# Patient Record
Sex: FEMALE | Race: WHITE
[De-identification: names, ages, dates, MRNs, and addresses within clinical notes are randomized per-mention and may not be internally consistent; named-entity substitution may affect disease eponyms.]

---

## 2019-01-03 ENCOUNTER — HOSPITAL ENCOUNTER (OUTPATIENT)
Age: 64
End: 2019-01-03
Payer: MEDICARE

## 2019-01-03 DIAGNOSIS — H92.02: ICD-10-CM

## 2019-01-03 DIAGNOSIS — R51: ICD-10-CM

## 2019-01-03 DIAGNOSIS — H93.12: Primary | ICD-10-CM

## 2019-01-03 LAB
CREATININE FINGERSTICK: 1.6 MG/DL (ref 0.55–1.02)
EGFR FINGERSTICK: 35 ML/MIN (ref 60–?)

## 2019-01-03 PROCEDURE — 70551 MRI BRAIN STEM W/O DYE: CPT

## 2019-05-06 ENCOUNTER — HOSPITAL ENCOUNTER (OUTPATIENT)
Age: 64
End: 2019-05-06
Payer: MEDICARE

## 2019-05-06 VITALS — BODY MASS INDEX: 40 KG/M2

## 2019-05-06 DIAGNOSIS — R94.31: Primary | ICD-10-CM

## 2019-05-06 PROCEDURE — 93306 TTE W/DOPPLER COMPLETE: CPT

## 2019-05-06 PROCEDURE — A4216 STERILE WATER/SALINE, 10 ML: HCPCS

## 2019-05-06 PROCEDURE — C8929 TTE W OR WO FOL WCON,DOPPLER: HCPCS

## 2020-09-10 ENCOUNTER — HOSPITAL ENCOUNTER (OUTPATIENT)
Age: 65
End: 2020-09-10
Payer: MEDICARE

## 2020-09-10 VITALS — BODY MASS INDEX: 39 KG/M2

## 2020-09-10 DIAGNOSIS — R06.02: ICD-10-CM

## 2020-09-10 DIAGNOSIS — I42.8: Primary | ICD-10-CM

## 2020-09-10 DIAGNOSIS — R06.00: ICD-10-CM

## 2020-09-10 PROCEDURE — 93306 TTE W/DOPPLER COMPLETE: CPT

## 2021-01-29 ENCOUNTER — HOSPITAL ENCOUNTER (OUTPATIENT)
Age: 66
End: 2021-01-29
Payer: MEDICARE

## 2021-01-29 VITALS — BODY MASS INDEX: 42.3 KG/M2

## 2021-01-29 DIAGNOSIS — I10: ICD-10-CM

## 2021-01-29 DIAGNOSIS — E78.2: ICD-10-CM

## 2021-01-29 DIAGNOSIS — I42.8: Primary | ICD-10-CM

## 2021-01-29 LAB
ANION GAP: 7 (ref 5–15)
BNP,B-TYPE NATRIURETIC PEPTIDE: 55 PG/ML (ref 0–100)
BUN SERPL-MCNC: 20 MG/DL (ref 7–18)
BUN/CREAT RATIO: 16.7 RATIO (ref 10–20)
CALCIUM SERPL-MCNC: 9.2 MG/DL (ref 8.5–10.1)
CARBON DIOXIDE: 24 MMOL/L (ref 21–32)
CHLORIDE: 109 MMOL/L (ref 98–107)
DEPRECATED RDW RBC: 48.3 FL (ref 35.1–43.9)
ERYTHROCYTE [DISTWIDTH] IN BLOOD: 13.6 % (ref 11.6–14.6)
EST GLOM FILT RATE - AFR AMER: 58 ML/MIN (ref 60–?)
GLUCOSE: 95 MG/DL (ref 74–106)
HCT VFR BLD AUTO: 41.8 % (ref 37–47)
HEMOGLOBIN: 13.5 G/DL (ref 12–15)
HGB BLD-MCNC: 13.5 G/DL (ref 12–15)
IMMATURE GRANULOCYTES COUNT: 0.01 X10^3/UL (ref 0–0)
MCV RBC: 96.1 FL (ref 81–99)
MEAN CORP HGB CONC: 32.3 G/DL (ref 32–36)
MEAN PLATELET VOL.: 9.6 FL (ref 6.2–12)
NRBC FLAGGED BY ANALYZER: 0 % (ref 0–5)
PLATELET # BLD: 217 K/MM3 (ref 150–450)
PLATELET COUNT: 217 K/MM3 (ref 150–450)
POTASSIUM: 4.4 MMOL/L (ref 3.5–5.1)
RBC # BLD AUTO: 4.35 M/MM3 (ref 4.2–5.4)
RBC DISTRIBUTION WIDTH CV: 13.6 % (ref 11.6–14.6)
RBC DISTRIBUTION WIDTH SD: 48.3 FL (ref 35.1–43.9)
WBC # BLD AUTO: 6.2 K/MM3 (ref 4.4–11)
WHITE BLOOD COUNT: 6.2 K/MM3 (ref 4.4–11)

## 2021-01-29 PROCEDURE — 85025 COMPLETE CBC W/AUTO DIFF WBC: CPT

## 2021-01-29 PROCEDURE — 36415 COLL VENOUS BLD VENIPUNCTURE: CPT

## 2021-01-29 PROCEDURE — 80048 BASIC METABOLIC PNL TOTAL CA: CPT

## 2021-01-29 PROCEDURE — 83880 ASSAY OF NATRIURETIC PEPTIDE: CPT

## 2021-11-15 ENCOUNTER — HOSPITAL ENCOUNTER (OUTPATIENT)
Age: 66
End: 2021-11-15
Payer: MEDICARE

## 2021-11-15 DIAGNOSIS — R06.00: ICD-10-CM

## 2021-11-15 DIAGNOSIS — I10: ICD-10-CM

## 2021-11-15 DIAGNOSIS — R53.83: Primary | ICD-10-CM

## 2021-11-15 LAB
ANION GAP: 7 (ref 5–15)
BNP,B-TYPE NATRIURETIC PEPTIDE: 49.7 PG/ML (ref 0–100)
BUN SERPL-MCNC: 25 MG/DL (ref 7–18)
BUN/CREAT RATIO: 20.2 RATIO (ref 10–20)
CALCIUM SERPL-MCNC: 9.4 MG/DL (ref 8.5–10.1)
CARBON DIOXIDE: 21 MMOL/L (ref 21–32)
CHLORIDE: 110 MMOL/L (ref 98–107)
DEPRECATED RDW RBC: 47.6 FL (ref 35.1–43.9)
ERYTHROCYTE [DISTWIDTH] IN BLOOD: 13.8 % (ref 11.6–14.6)
EST GLOM FILT RATE - AFR AMER: 55 ML/MIN (ref 60–?)
GLUCOSE: 91 MG/DL (ref 74–106)
HCT VFR BLD AUTO: 39.3 % (ref 37–47)
HEMOGLOBIN: 13.1 G/DL (ref 12–15)
HGB BLD-MCNC: 13.1 G/DL (ref 12–15)
IMMATURE GRANULOCYTES COUNT: 0.02 X10^3/UL (ref 0–0)
MCV RBC: 94 FL (ref 81–99)
MEAN CORP HGB CONC: 33.3 G/DL (ref 32–36)
MEAN PLATELET VOL.: 9.3 FL (ref 6.2–12)
NRBC FLAGGED BY ANALYZER: 0 % (ref 0–5)
PLATELET # BLD: 213 K/MM3 (ref 150–450)
PLATELET COUNT: 213 K/MM3 (ref 150–450)
POTASSIUM: 4.2 MMOL/L (ref 3.5–5.1)
RBC # BLD AUTO: 4.18 M/MM3 (ref 4.2–5.4)
RBC DISTRIBUTION WIDTH CV: 13.8 % (ref 11.6–14.6)
RBC DISTRIBUTION WIDTH SD: 47.6 FL (ref 35.1–43.9)
WBC # BLD AUTO: 6.8 K/MM3 (ref 4.4–11)
WHITE BLOOD COUNT: 6.8 K/MM3 (ref 4.4–11)

## 2021-11-15 PROCEDURE — 36415 COLL VENOUS BLD VENIPUNCTURE: CPT

## 2021-11-15 PROCEDURE — 80048 BASIC METABOLIC PNL TOTAL CA: CPT

## 2021-11-15 PROCEDURE — 83880 ASSAY OF NATRIURETIC PEPTIDE: CPT

## 2021-11-15 PROCEDURE — 85025 COMPLETE CBC W/AUTO DIFF WBC: CPT

## 2023-01-10 ENCOUNTER — HOSPITAL ENCOUNTER (OUTPATIENT)
Dept: HOSPITAL 100 - LAB | Age: 68
Discharge: HOME | End: 2023-01-10
Payer: MEDICARE

## 2023-01-10 DIAGNOSIS — I42.8: Primary | ICD-10-CM

## 2023-01-10 DIAGNOSIS — R53.83: ICD-10-CM

## 2023-01-10 DIAGNOSIS — R06.00: ICD-10-CM

## 2023-01-10 DIAGNOSIS — I10: ICD-10-CM

## 2023-01-10 LAB
ANION GAP: 8 (ref 5–15)
BNP,B-TYPE NATRIURETIC PEPTIDE: 106.1 PG/ML (ref 0–100)
BUN SERPL-MCNC: 19 MG/DL (ref 7–18)
BUN/CREAT RATIO: 18.3 RATIO (ref 10–20)
CALCIUM SERPL-MCNC: 9.6 MG/DL (ref 8.5–10.1)
CARBON DIOXIDE: 22 MMOL/L (ref 21–32)
CHLORIDE: 110 MMOL/L (ref 98–107)
DEPRECATED RDW RBC: 45 FL (ref 35.1–43.9)
ERYTHROCYTE [DISTWIDTH] IN BLOOD: 13.2 % (ref 11.6–14.6)
EST GLOM FILT RATE - AFR AMER: 68 ML/MIN (ref 60–?)
GLUCOSE: 99 MG/DL (ref 74–106)
HCT VFR BLD AUTO: 41.8 % (ref 37–47)
HEMOGLOBIN: 14 G/DL (ref 12–15)
HGB BLD-MCNC: 14 G/DL (ref 12–15)
IMMATURE GRANULOCYTES COUNT: 0.03 X10^3/UL (ref 0–0)
MCV RBC: 93.5 FL (ref 81–99)
MEAN CORP HGB CONC: 33.5 G/DL (ref 32–36)
MEAN PLATELET VOL.: 9.9 FL (ref 6.2–12)
NRBC FLAGGED BY ANALYZER: 0 % (ref 0–5)
PLATELET # BLD: 244 K/MM3 (ref 150–450)
PLATELET COUNT: 244 K/MM3 (ref 150–450)
POTASSIUM: 4.2 MMOL/L (ref 3.5–5.1)
RBC # BLD AUTO: 4.47 M/MM3 (ref 4.2–5.4)
RBC DISTRIBUTION WIDTH CV: 13.2 % (ref 11.6–14.6)
RBC DISTRIBUTION WIDTH SD: 45 FL (ref 35.1–43.9)
WBC # BLD AUTO: 6 K/MM3 (ref 4.4–11)
WHITE BLOOD COUNT: 6 K/MM3 (ref 4.4–11)

## 2023-01-10 PROCEDURE — 36415 COLL VENOUS BLD VENIPUNCTURE: CPT

## 2023-01-10 PROCEDURE — 85025 COMPLETE CBC W/AUTO DIFF WBC: CPT

## 2023-01-10 PROCEDURE — 80048 BASIC METABOLIC PNL TOTAL CA: CPT

## 2023-01-10 PROCEDURE — 84443 ASSAY THYROID STIM HORMONE: CPT

## 2023-01-10 PROCEDURE — 83880 ASSAY OF NATRIURETIC PEPTIDE: CPT

## 2023-04-24 ENCOUNTER — HOSPITAL ENCOUNTER (OUTPATIENT)
Age: 68
Discharge: HOME | End: 2023-04-24
Payer: MEDICARE

## 2023-04-24 DIAGNOSIS — I42.8: Primary | ICD-10-CM

## 2023-04-24 DIAGNOSIS — I50.9: ICD-10-CM

## 2023-04-24 PROCEDURE — A4216 STERILE WATER/SALINE, 10 ML: HCPCS

## 2023-04-24 PROCEDURE — 93306 TTE W/DOPPLER COMPLETE: CPT

## 2023-04-24 PROCEDURE — C8929 TTE W OR WO FOL WCON,DOPPLER: HCPCS

## 2023-07-24 ENCOUNTER — HOSPITAL ENCOUNTER (OUTPATIENT)
Dept: HOSPITAL 100 - CVS | Age: 68
Discharge: HOME | End: 2023-07-24
Payer: MEDICARE

## 2023-07-24 DIAGNOSIS — I42.8: Primary | ICD-10-CM

## 2023-07-24 PROCEDURE — 93308 TTE F-UP OR LMTD: CPT

## 2023-07-24 PROCEDURE — A4216 STERILE WATER/SALINE, 10 ML: HCPCS

## 2023-07-24 PROCEDURE — C8924 2D TTE W OR W/O FOL W/CON,FU: HCPCS

## 2023-07-26 ENCOUNTER — HOSPITAL ENCOUNTER (OUTPATIENT)
Dept: HOSPITAL 100 - SDC | Age: 68
Discharge: HOME | End: 2023-07-26
Payer: MEDICARE

## 2023-07-26 VITALS
DIASTOLIC BLOOD PRESSURE: 79 MMHG | SYSTOLIC BLOOD PRESSURE: 144 MMHG | RESPIRATION RATE: 18 BRPM | TEMPERATURE: 96.9 F | OXYGEN SATURATION: 99 % | HEART RATE: 85 BPM

## 2023-07-26 VITALS
DIASTOLIC BLOOD PRESSURE: 79 MMHG | OXYGEN SATURATION: 100 % | HEART RATE: 60 BPM | SYSTOLIC BLOOD PRESSURE: 139 MMHG | RESPIRATION RATE: 16 BRPM

## 2023-07-26 VITALS
SYSTOLIC BLOOD PRESSURE: 144 MMHG | OXYGEN SATURATION: 98 % | TEMPERATURE: 97.88 F | HEART RATE: 88 BPM | DIASTOLIC BLOOD PRESSURE: 79 MMHG | RESPIRATION RATE: 16 BRPM

## 2023-07-26 VITALS
HEART RATE: 59 BPM | DIASTOLIC BLOOD PRESSURE: 79 MMHG | TEMPERATURE: 96.5 F | SYSTOLIC BLOOD PRESSURE: 137 MMHG | OXYGEN SATURATION: 99 % | RESPIRATION RATE: 16 BRPM

## 2023-07-26 VITALS
TEMPERATURE: 97 F | OXYGEN SATURATION: 94 % | DIASTOLIC BLOOD PRESSURE: 83 MMHG | HEART RATE: 55 BPM | SYSTOLIC BLOOD PRESSURE: 144 MMHG | RESPIRATION RATE: 16 BRPM

## 2023-07-26 VITALS
HEART RATE: 73 BPM | SYSTOLIC BLOOD PRESSURE: 144 MMHG | DIASTOLIC BLOOD PRESSURE: 79 MMHG | RESPIRATION RATE: 16 BRPM | OXYGEN SATURATION: 100 %

## 2023-07-26 VITALS — BODY MASS INDEX: 39.6 KG/M2

## 2023-07-26 DIAGNOSIS — Z79.899: ICD-10-CM

## 2023-07-26 DIAGNOSIS — R20.8: ICD-10-CM

## 2023-07-26 DIAGNOSIS — I42.8: ICD-10-CM

## 2023-07-26 DIAGNOSIS — Z80.8: ICD-10-CM

## 2023-07-26 DIAGNOSIS — I10: ICD-10-CM

## 2023-07-26 DIAGNOSIS — R22.0: Primary | ICD-10-CM

## 2023-07-26 PROCEDURE — 00300 ANES ALL PX INTEG H/N/PTRUNK: CPT

## 2023-07-26 PROCEDURE — 88304 TISSUE EXAM BY PATHOLOGIST: CPT

## 2023-07-26 PROCEDURE — 88305 TISSUE EXAM BY PATHOLOGIST: CPT

## 2023-07-26 PROCEDURE — 21011 EXC FACE LES SC <2 CM: CPT

## 2023-07-26 RX ADMIN — CLINDAMYCIN IN 5 PERCENT DEXTROSE 75 MG: 18 INJECTION, SOLUTION INTRAVENOUS at 08:06

## 2023-07-26 RX ADMIN — LIDOCAINE HYDROCHLORIDE AND EPINEPHRINE 20 ML: 10; 10 INJECTION, SOLUTION INFILTRATION; PERINEURAL at 08:54

## 2023-08-08 ENCOUNTER — HOSPITAL ENCOUNTER (OUTPATIENT)
Dept: HOSPITAL 100 - LAB | Age: 68
Discharge: HOME | End: 2023-08-08
Payer: MEDICARE

## 2023-08-08 DIAGNOSIS — I10: ICD-10-CM

## 2023-08-08 DIAGNOSIS — I44.7: ICD-10-CM

## 2023-08-08 DIAGNOSIS — I42.8: ICD-10-CM

## 2023-08-08 DIAGNOSIS — R06.00: Primary | ICD-10-CM

## 2023-08-08 DIAGNOSIS — E78.2: ICD-10-CM

## 2023-08-08 LAB
ANION GAP: 3 (ref 5–15)
BUN SERPL-MCNC: 18 MG/DL (ref 7–18)
BUN/CREAT RATIO: 15.5 RATIO (ref 10–20)
CALCIUM SERPL-MCNC: 9.2 MG/DL (ref 8.5–10.1)
CARBON DIOXIDE: 25 MMOL/L (ref 21–32)
CHLORIDE: 112 MMOL/L (ref 98–107)
EST GLOM FILT RATE - AFR AMER: 60 ML/MIN (ref 60–?)
GLUCOSE: 110 MG/DL (ref 74–106)
POTASSIUM: 4.4 MMOL/L (ref 3.5–5.1)

## 2023-08-08 PROCEDURE — 36415 COLL VENOUS BLD VENIPUNCTURE: CPT

## 2023-08-08 PROCEDURE — 80048 BASIC METABOLIC PNL TOTAL CA: CPT

## 2023-11-29 ENCOUNTER — HOSPITAL ENCOUNTER (OUTPATIENT)
Dept: HOSPITAL 100 - CVS | Age: 68
Discharge: HOME | End: 2023-11-29
Payer: MEDICARE

## 2023-11-29 DIAGNOSIS — R06.00: Primary | ICD-10-CM

## 2023-11-29 DIAGNOSIS — E78.2: ICD-10-CM

## 2023-11-29 DIAGNOSIS — I10: ICD-10-CM

## 2023-11-29 DIAGNOSIS — I44.7: ICD-10-CM

## 2023-11-29 DIAGNOSIS — I42.8: ICD-10-CM

## 2023-11-29 PROCEDURE — A9500 TC99M SESTAMIBI: HCPCS

## 2023-11-29 PROCEDURE — 93017 CV STRESS TEST TRACING ONLY: CPT

## 2023-11-29 PROCEDURE — A4216 STERILE WATER/SALINE, 10 ML: HCPCS

## 2023-11-29 PROCEDURE — 78452 HT MUSCLE IMAGE SPECT MULT: CPT

## 2023-12-20 DIAGNOSIS — Z13.220 ENCOUNTER FOR SCREENING FOR LIPOID DISORDERS: ICD-10-CM

## 2023-12-20 DIAGNOSIS — E03.9 HYPOTHYROIDISM, UNSPECIFIED: Primary | ICD-10-CM

## 2023-12-20 DIAGNOSIS — E78.5 HYPERLIPIDEMIA, UNSPECIFIED: ICD-10-CM

## 2024-04-10 ENCOUNTER — HOSPITAL ENCOUNTER (OUTPATIENT)
Age: 69
Discharge: HOME | End: 2024-04-10
Payer: MEDICARE

## 2024-04-10 DIAGNOSIS — I10: Primary | ICD-10-CM

## 2024-04-10 PROCEDURE — 93308 TTE F-UP OR LMTD: CPT

## 2024-04-18 ENCOUNTER — HOSPITAL ENCOUNTER (OUTPATIENT)
Dept: RADIOLOGY | Facility: CLINIC | Age: 69
Discharge: HOME | End: 2024-04-18
Payer: MEDICARE

## 2024-04-18 VITALS — HEIGHT: 61 IN | BODY MASS INDEX: 39.65 KG/M2 | WEIGHT: 210 LBS

## 2024-04-18 DIAGNOSIS — Z12.31 ENCOUNTER FOR SCREENING MAMMOGRAM FOR MALIGNANT NEOPLASM OF BREAST: ICD-10-CM

## 2024-04-18 PROCEDURE — 77063 BREAST TOMOSYNTHESIS BI: CPT

## 2024-04-18 PROCEDURE — 77067 SCR MAMMO BI INCL CAD: CPT | Performed by: RADIOLOGY

## 2024-04-18 PROCEDURE — 77063 BREAST TOMOSYNTHESIS BI: CPT | Performed by: RADIOLOGY

## 2024-04-18 PROCEDURE — 77067 SCR MAMMO BI INCL CAD: CPT

## 2024-05-12 ENCOUNTER — HOSPITAL ENCOUNTER (EMERGENCY)
Age: 69
Discharge: HOME | End: 2024-05-12
Payer: MEDICARE

## 2024-05-12 VITALS
TEMPERATURE: 97.6 F | RESPIRATION RATE: 18 BRPM | OXYGEN SATURATION: 99 % | HEART RATE: 91 BPM | DIASTOLIC BLOOD PRESSURE: 80 MMHG | SYSTOLIC BLOOD PRESSURE: 113 MMHG

## 2024-05-12 VITALS — RESPIRATION RATE: 12 BRPM | HEART RATE: 82 BPM | SYSTOLIC BLOOD PRESSURE: 117 MMHG | DIASTOLIC BLOOD PRESSURE: 82 MMHG

## 2024-05-12 VITALS
DIASTOLIC BLOOD PRESSURE: 61 MMHG | RESPIRATION RATE: 13 BRPM | SYSTOLIC BLOOD PRESSURE: 131 MMHG | OXYGEN SATURATION: 95 % | HEART RATE: 80 BPM

## 2024-05-12 VITALS
SYSTOLIC BLOOD PRESSURE: 117 MMHG | TEMPERATURE: 98 F | RESPIRATION RATE: 14 BRPM | HEART RATE: 76 BPM | DIASTOLIC BLOOD PRESSURE: 82 MMHG | OXYGEN SATURATION: 96 %

## 2024-05-12 VITALS — BODY MASS INDEX: 37.8 KG/M2

## 2024-05-12 DIAGNOSIS — I42.8: ICD-10-CM

## 2024-05-12 DIAGNOSIS — R06.00: ICD-10-CM

## 2024-05-12 DIAGNOSIS — F41.9: Primary | ICD-10-CM

## 2024-05-12 DIAGNOSIS — K58.9: ICD-10-CM

## 2024-05-12 DIAGNOSIS — E87.6: ICD-10-CM

## 2024-05-12 DIAGNOSIS — I10: ICD-10-CM

## 2024-05-12 DIAGNOSIS — Z79.899: ICD-10-CM

## 2024-05-12 DIAGNOSIS — R11.0: ICD-10-CM

## 2024-05-12 LAB
ALANINE AMINOTRANSFER ALT/SGPT: 18 U/L (ref 13–56)
ALBUMIN SERPL-MCNC: 3.9 G/DL (ref 3.2–5)
ALKALINE PHOSPHATASE: 80 U/L (ref 45–117)
ANION GAP: 13 (ref 5–15)
AST(SGOT): 14 U/L (ref 15–37)
BUN SERPL-MCNC: 24 MG/DL (ref 7–18)
BUN/CREAT RATIO: 16.6 RATIO (ref 10–20)
CALCIUM SERPL-MCNC: 9.7 MG/DL (ref 8.5–10.1)
CARBON DIOXIDE: 22 MMOL/L (ref 21–32)
CHLORIDE: 105 MMOL/L (ref 98–107)
DEPRECATED RDW RBC: 43.3 FL (ref 35.1–43.9)
ERYTHROCYTE [DISTWIDTH] IN BLOOD: 12.6 % (ref 11.6–14.6)
EST GLOM FILT RATE - AFR AMER: 46 ML/MIN (ref 60–?)
ESTIMATED CREATININE CLEARANCE: 38.51 ML/MIN
GLOBULIN: 3.6 G/DL (ref 2.2–4.2)
GLUCOSE: 124 MG/DL (ref 74–106)
HCT VFR BLD AUTO: 44.2 % (ref 37–47)
HGB BLD-MCNC: 15.1 G/DL (ref 12–15)
IMMATURE GRANULOCYTES COUNT: 0.04 X10^3/UL (ref 0–0)
LIPASE: 46 U/L (ref 13–75)
MCV RBC: 92.7 FL (ref 81–99)
MEAN CORP HGB CONC: 34.2 G/DL (ref 32–36)
MEAN PLATELET VOL.: 8.9 FL (ref 6.2–12)
NRBC FLAGGED BY ANALYZER: 0 % (ref 0–5)
PLATELET # BLD: 217 K/MM3 (ref 150–450)
POTASSIUM: 3.2 MMOL/L (ref 3.5–5.1)
RBC # BLD AUTO: 4.77 M/MM3 (ref 4.2–5.4)
TROPONIN-I HS: 17 PG/ML (ref 3–54)
WBC # BLD AUTO: 8.3 K/MM3 (ref 4.4–11)

## 2024-05-12 PROCEDURE — 96375 TX/PRO/DX INJ NEW DRUG ADDON: CPT

## 2024-05-12 PROCEDURE — 80053 COMPREHEN METABOLIC PANEL: CPT

## 2024-05-12 PROCEDURE — 99284 EMERGENCY DEPT VISIT MOD MDM: CPT

## 2024-05-12 PROCEDURE — 83690 ASSAY OF LIPASE: CPT

## 2024-05-12 PROCEDURE — 84484 ASSAY OF TROPONIN QUANT: CPT

## 2024-05-12 PROCEDURE — 71045 X-RAY EXAM CHEST 1 VIEW: CPT

## 2024-05-12 PROCEDURE — 74176 CT ABD & PELVIS W/O CONTRAST: CPT

## 2024-05-12 PROCEDURE — 85025 COMPLETE CBC W/AUTO DIFF WBC: CPT

## 2024-05-12 PROCEDURE — 96374 THER/PROPH/DIAG INJ IV PUSH: CPT

## 2024-05-12 PROCEDURE — A4216 STERILE WATER/SALINE, 10 ML: HCPCS

## 2024-05-12 PROCEDURE — 93005 ELECTROCARDIOGRAM TRACING: CPT

## 2024-10-21 DIAGNOSIS — E66.01 MORBID (SEVERE) OBESITY DUE TO EXCESS CALORIES (MULTI): ICD-10-CM

## 2024-10-21 DIAGNOSIS — E03.9 HYPOTHYROIDISM, UNSPECIFIED: Primary | ICD-10-CM

## 2024-12-18 ENCOUNTER — HOSPITAL ENCOUNTER (OUTPATIENT)
Dept: HOSPITAL 100 - RAD | Age: 69
Discharge: HOME | End: 2024-12-18
Payer: MEDICARE

## 2024-12-18 DIAGNOSIS — I42.8: ICD-10-CM

## 2024-12-18 DIAGNOSIS — R94.31: ICD-10-CM

## 2024-12-18 DIAGNOSIS — R05.9: ICD-10-CM

## 2024-12-18 DIAGNOSIS — R06.00: Primary | ICD-10-CM

## 2024-12-18 LAB
ANION GAP: 9 (ref 5–15)
BNP,B-TYPE NATRIURETIC PEPTIDE: 97.2 PG/ML (ref 0–100)
BUN SERPL-MCNC: 28 MG/DL (ref 7–18)
BUN/CREAT RATIO: 24.8 RATIO (ref 10–20)
CALCIUM SERPL-MCNC: 9.4 MG/DL (ref 8.5–10.1)
CARBON DIOXIDE: 19 MMOL/L (ref 21–32)
CHLORIDE: 111 MMOL/L (ref 98–107)
DEPRECATED RDW RBC: 47.4 FL (ref 35.1–43.9)
ERYTHROCYTE [DISTWIDTH] IN BLOOD: 13.5 % (ref 11.6–14.6)
EST GLOM FILT RATE - AFR AMER: 61 ML/MIN (ref 60–?)
GLUCOSE: 107 MG/DL (ref 74–106)
HCT VFR BLD AUTO: 45.5 % (ref 37–47)
HEMOGLOBIN: 14.6 G/DL (ref 12–15)
HGB BLD-MCNC: 14.6 G/DL (ref 12–15)
IMMATURE GRANULOCYTES COUNT: 0.02 X10^3/UL (ref 0–0)
MCV RBC: 95 FL (ref 81–99)
MEAN CORP HGB CONC: 32.1 G/DL (ref 32–36)
MEAN PLATELET VOL.: 9 FL (ref 6.2–12)
NRBC FLAGGED BY ANALYZER: 0 % (ref 0–5)
PLATELET # BLD: 221 K/MM3 (ref 150–450)
PLATELET COUNT: 221 K/MM3 (ref 150–450)
POTASSIUM: 4.2 MMOL/L (ref 3.5–5.1)
RBC # BLD AUTO: 4.79 M/MM3 (ref 4.2–5.4)
RBC DISTRIBUTION WIDTH CV: 13.5 % (ref 11.6–14.6)
RBC DISTRIBUTION WIDTH SD: 47.4 FL (ref 35.1–43.9)
WBC # BLD AUTO: 7.4 K/MM3 (ref 4.4–11)
WHITE BLOOD COUNT: 7.4 K/MM3 (ref 4.4–11)

## 2024-12-18 PROCEDURE — 85025 COMPLETE CBC W/AUTO DIFF WBC: CPT

## 2024-12-18 PROCEDURE — 83880 ASSAY OF NATRIURETIC PEPTIDE: CPT

## 2024-12-18 PROCEDURE — 36415 COLL VENOUS BLD VENIPUNCTURE: CPT

## 2024-12-18 PROCEDURE — 71046 X-RAY EXAM CHEST 2 VIEWS: CPT

## 2024-12-18 PROCEDURE — 80048 BASIC METABOLIC PNL TOTAL CA: CPT

## 2024-12-27 ENCOUNTER — HOSPITAL ENCOUNTER (OUTPATIENT)
Dept: HOSPITAL 100 - LAB | Age: 69
Discharge: HOME | End: 2024-12-27
Payer: MEDICARE

## 2024-12-27 DIAGNOSIS — R53.83: Primary | ICD-10-CM

## 2024-12-27 LAB
FREE T3: 1.8 PG/ML (ref 2.18–3.98)
T4 SERPL-MCNC: 8.9 UG/DL (ref 4.8–13.9)

## 2024-12-27 PROCEDURE — 84443 ASSAY THYROID STIM HORMONE: CPT

## 2024-12-27 PROCEDURE — 36415 COLL VENOUS BLD VENIPUNCTURE: CPT

## 2024-12-27 PROCEDURE — 84481 FREE ASSAY (FT-3): CPT

## 2024-12-27 PROCEDURE — 84436 ASSAY OF TOTAL THYROXINE: CPT

## 2025-03-06 ENCOUNTER — HOSPITAL ENCOUNTER (EMERGENCY)
Dept: HOSPITAL 100 - ED | Age: 70
LOS: 1 days | Discharge: OTHER INSTITUTION | End: 2025-03-07
Payer: MEDICARE

## 2025-03-06 VITALS — DIASTOLIC BLOOD PRESSURE: 69 MMHG | SYSTOLIC BLOOD PRESSURE: 105 MMHG

## 2025-03-06 VITALS
RESPIRATION RATE: 18 BRPM | SYSTOLIC BLOOD PRESSURE: 140 MMHG | DIASTOLIC BLOOD PRESSURE: 56 MMHG | OXYGEN SATURATION: 95 % | HEART RATE: 80 BPM

## 2025-03-06 VITALS — RESPIRATION RATE: 17 BRPM | HEART RATE: 79 BPM

## 2025-03-06 VITALS
HEART RATE: 70 BPM | DIASTOLIC BLOOD PRESSURE: 76 MMHG | SYSTOLIC BLOOD PRESSURE: 111 MMHG | OXYGEN SATURATION: 98 % | RESPIRATION RATE: 11 BRPM

## 2025-03-06 VITALS — HEART RATE: 75 BPM | RESPIRATION RATE: 15 BRPM

## 2025-03-06 VITALS
DIASTOLIC BLOOD PRESSURE: 89 MMHG | RESPIRATION RATE: 18 BRPM | SYSTOLIC BLOOD PRESSURE: 128 MMHG | OXYGEN SATURATION: 96 % | HEART RATE: 148 BPM | TEMPERATURE: 98.06 F

## 2025-03-06 VITALS — DIASTOLIC BLOOD PRESSURE: 76 MMHG | SYSTOLIC BLOOD PRESSURE: 115 MMHG

## 2025-03-06 VITALS — HEART RATE: 90 BPM | RESPIRATION RATE: 17 BRPM

## 2025-03-06 VITALS
RESPIRATION RATE: 15 BRPM | HEART RATE: 73 BPM | OXYGEN SATURATION: 94 % | DIASTOLIC BLOOD PRESSURE: 73 MMHG | SYSTOLIC BLOOD PRESSURE: 111 MMHG

## 2025-03-06 VITALS
HEART RATE: 69 BPM | SYSTOLIC BLOOD PRESSURE: 122 MMHG | OXYGEN SATURATION: 97 % | DIASTOLIC BLOOD PRESSURE: 77 MMHG | RESPIRATION RATE: 16 BRPM

## 2025-03-06 VITALS — HEART RATE: 65 BPM | SYSTOLIC BLOOD PRESSURE: 122 MMHG | DIASTOLIC BLOOD PRESSURE: 77 MMHG | RESPIRATION RATE: 12 BRPM

## 2025-03-06 VITALS — BODY MASS INDEX: 41 KG/M2

## 2025-03-06 VITALS — RESPIRATION RATE: 13 BRPM | HEART RATE: 68 BPM | SYSTOLIC BLOOD PRESSURE: 128 MMHG | DIASTOLIC BLOOD PRESSURE: 76 MMHG

## 2025-03-06 VITALS — RESPIRATION RATE: 14 BRPM | HEART RATE: 88 BPM

## 2025-03-06 VITALS — RESPIRATION RATE: 17 BRPM | HEART RATE: 73 BPM

## 2025-03-06 VITALS — OXYGEN SATURATION: 96 %

## 2025-03-06 DIAGNOSIS — E78.2: ICD-10-CM

## 2025-03-06 DIAGNOSIS — Z95.810: ICD-10-CM

## 2025-03-06 DIAGNOSIS — I10: ICD-10-CM

## 2025-03-06 DIAGNOSIS — I42.8: ICD-10-CM

## 2025-03-06 DIAGNOSIS — R00.2: Primary | ICD-10-CM

## 2025-03-06 DIAGNOSIS — Z79.899: ICD-10-CM

## 2025-03-06 LAB
ANION GAP: 16 (ref 5–15)
BUN SERPL-MCNC: 16 MG/DL (ref 4–19)
BUN/CREAT RATIO: 13.4 RATIO (ref 10–20)
CALCIUM SERPL-MCNC: 9.7 MG/DL (ref 7.6–11)
CARBON DIOXIDE: 16.2 MMOL/L (ref 21–32)
CHLORIDE: 107 MMOL/L (ref 98–108)
DEPRECATED RDW RBC: 50.4 FL (ref 35.1–43.9)
ERYTHROCYTE [DISTWIDTH] IN BLOOD: 14.6 % (ref 11.6–14.6)
ESTIMATED CREATININE CLEARANCE: 45.44 ML/MIN (ref 50–250)
GLUCOSE: 131 MG/DL (ref 70–99)
HCT VFR BLD AUTO: 43.9 % (ref 37–47)
HEMOGLOBIN: 14.4 G/DL (ref 12–15)
HGB BLD-MCNC: 14.4 G/DL (ref 12–15)
IMMATURE GRANULOCYTES COUNT: 0.03 X10^3/UL (ref 0–0)
MCV RBC: 93.2 FL (ref 81–99)
MEAN CORP HGB CONC: 32.8 G/DL (ref 32–36)
MEAN PLATELET VOL.: 9.9 FL (ref 6.2–12)
NRBC FLAGGED BY ANALYZER: 0 % (ref 0–5)
PLATELET # BLD: 203 K/MM3 (ref 150–450)
PLATELET COUNT: 203 K/MM3 (ref 150–450)
POTASSIUM: 3.5 MMOL/L (ref 3.3–5.1)
RBC # BLD AUTO: 4.71 M/MM3 (ref 4.2–5.4)
RBC DISTRIBUTION WIDTH CV: 14.6 % (ref 11.6–14.6)
RBC DISTRIBUTION WIDTH SD: 50.4 FL (ref 35.1–43.9)
TROPONIN T HIGH SENSITIVITY: 23 NG/L (ref ?–14)
WBC # BLD AUTO: 8.1 K/MM3 (ref 4.4–11)
WHITE BLOOD COUNT: 8.1 K/MM3 (ref 4.4–11)

## 2025-03-06 PROCEDURE — 85025 COMPLETE CBC W/AUTO DIFF WBC: CPT

## 2025-03-06 PROCEDURE — 93288 INTERROG EVL PM/LDLS PM IP: CPT

## 2025-03-06 PROCEDURE — 84484 ASSAY OF TROPONIN QUANT: CPT

## 2025-03-06 PROCEDURE — 93005 ELECTROCARDIOGRAM TRACING: CPT

## 2025-03-06 PROCEDURE — 96375 TX/PRO/DX INJ NEW DRUG ADDON: CPT

## 2025-03-06 PROCEDURE — 96374 THER/PROPH/DIAG INJ IV PUSH: CPT

## 2025-03-06 PROCEDURE — A4216 STERILE WATER/SALINE, 10 ML: HCPCS

## 2025-03-06 PROCEDURE — 96376 TX/PRO/DX INJ SAME DRUG ADON: CPT

## 2025-03-06 PROCEDURE — 80048 BASIC METABOLIC PNL TOTAL CA: CPT

## 2025-03-06 PROCEDURE — 71046 X-RAY EXAM CHEST 2 VIEWS: CPT

## 2025-03-06 PROCEDURE — 99285 EMERGENCY DEPT VISIT HI MDM: CPT

## 2025-03-06 RX ADMIN — LORAZEPAM 0.5 MG: 2 INJECTION INTRAMUSCULAR; INTRAVENOUS at 22:51

## 2025-03-07 VITALS — HEART RATE: 72 BPM | RESPIRATION RATE: 15 BRPM

## 2025-03-07 VITALS — HEART RATE: 73 BPM | RESPIRATION RATE: 19 BRPM

## 2025-03-07 VITALS — HEART RATE: 70 BPM | RESPIRATION RATE: 16 BRPM

## 2025-03-07 VITALS — RESPIRATION RATE: 18 BRPM | HEART RATE: 76 BPM

## 2025-03-07 VITALS — HEART RATE: 89 BPM | SYSTOLIC BLOOD PRESSURE: 99 MMHG | RESPIRATION RATE: 16 BRPM | DIASTOLIC BLOOD PRESSURE: 77 MMHG

## 2025-03-07 VITALS
DIASTOLIC BLOOD PRESSURE: 79 MMHG | HEART RATE: 81 BPM | RESPIRATION RATE: 19 BRPM | OXYGEN SATURATION: 95 % | SYSTOLIC BLOOD PRESSURE: 133 MMHG

## 2025-03-07 VITALS
SYSTOLIC BLOOD PRESSURE: 123 MMHG | DIASTOLIC BLOOD PRESSURE: 75 MMHG | OXYGEN SATURATION: 97 % | HEART RATE: 89 BPM | RESPIRATION RATE: 16 BRPM | TEMPERATURE: 98.24 F

## 2025-03-07 VITALS — DIASTOLIC BLOOD PRESSURE: 69 MMHG | RESPIRATION RATE: 13 BRPM | SYSTOLIC BLOOD PRESSURE: 113 MMHG | HEART RATE: 77 BPM

## 2025-03-07 VITALS — RESPIRATION RATE: 13 BRPM | HEART RATE: 87 BPM

## 2025-03-07 VITALS
RESPIRATION RATE: 16 BRPM | OXYGEN SATURATION: 97 % | HEART RATE: 84 BPM | DIASTOLIC BLOOD PRESSURE: 78 MMHG | SYSTOLIC BLOOD PRESSURE: 129 MMHG

## 2025-03-07 VITALS
OXYGEN SATURATION: 96 % | SYSTOLIC BLOOD PRESSURE: 136 MMHG | DIASTOLIC BLOOD PRESSURE: 74 MMHG | HEART RATE: 83 BPM | RESPIRATION RATE: 16 BRPM

## 2025-03-07 VITALS — HEART RATE: 84 BPM | DIASTOLIC BLOOD PRESSURE: 68 MMHG | RESPIRATION RATE: 14 BRPM | SYSTOLIC BLOOD PRESSURE: 114 MMHG

## 2025-03-07 VITALS — RESPIRATION RATE: 17 BRPM

## 2025-03-07 VITALS — HEART RATE: 80 BPM | RESPIRATION RATE: 9 BRPM

## 2025-03-07 VITALS — HEART RATE: 68 BPM | RESPIRATION RATE: 20 BRPM

## 2025-03-07 VITALS — RESPIRATION RATE: 17 BRPM | HEART RATE: 71 BPM

## 2025-03-07 VITALS — HEART RATE: 72 BPM | RESPIRATION RATE: 17 BRPM

## 2025-03-07 VITALS — RESPIRATION RATE: 15 BRPM | HEART RATE: 70 BPM

## 2025-03-07 VITALS
HEART RATE: 109 BPM | RESPIRATION RATE: 16 BRPM | DIASTOLIC BLOOD PRESSURE: 74 MMHG | OXYGEN SATURATION: 96 % | SYSTOLIC BLOOD PRESSURE: 113 MMHG

## 2025-03-07 VITALS — SYSTOLIC BLOOD PRESSURE: 128 MMHG | DIASTOLIC BLOOD PRESSURE: 69 MMHG | RESPIRATION RATE: 15 BRPM | HEART RATE: 77 BPM

## 2025-03-07 VITALS — SYSTOLIC BLOOD PRESSURE: 99 MMHG | RESPIRATION RATE: 21 BRPM | DIASTOLIC BLOOD PRESSURE: 54 MMHG | HEART RATE: 85 BPM

## 2025-03-07 VITALS
OXYGEN SATURATION: 97 % | HEART RATE: 100 BPM | RESPIRATION RATE: 16 BRPM | SYSTOLIC BLOOD PRESSURE: 123 MMHG | DIASTOLIC BLOOD PRESSURE: 82 MMHG

## 2025-03-07 VITALS — RESPIRATION RATE: 11 BRPM | HEART RATE: 72 BPM

## 2025-03-07 VITALS — RESPIRATION RATE: 22 BRPM | HEART RATE: 73 BPM

## 2025-03-07 VITALS
SYSTOLIC BLOOD PRESSURE: 125 MMHG | OXYGEN SATURATION: 97 % | HEART RATE: 89 BPM | DIASTOLIC BLOOD PRESSURE: 76 MMHG | RESPIRATION RATE: 18 BRPM

## 2025-03-07 VITALS — RESPIRATION RATE: 25 BRPM | HEART RATE: 77 BPM

## 2025-03-07 VITALS — RESPIRATION RATE: 11 BRPM | HEART RATE: 82 BPM | DIASTOLIC BLOOD PRESSURE: 67 MMHG | SYSTOLIC BLOOD PRESSURE: 117 MMHG

## 2025-03-07 VITALS — RESPIRATION RATE: 13 BRPM | HEART RATE: 81 BPM | SYSTOLIC BLOOD PRESSURE: 107 MMHG | DIASTOLIC BLOOD PRESSURE: 73 MMHG

## 2025-03-07 VITALS — RESPIRATION RATE: 17 BRPM | HEART RATE: 68 BPM | DIASTOLIC BLOOD PRESSURE: 69 MMHG | SYSTOLIC BLOOD PRESSURE: 106 MMHG

## 2025-03-07 VITALS — DIASTOLIC BLOOD PRESSURE: 51 MMHG | RESPIRATION RATE: 14 BRPM | SYSTOLIC BLOOD PRESSURE: 90 MMHG | HEART RATE: 70 BPM

## 2025-03-07 VITALS — RESPIRATION RATE: 14 BRPM | HEART RATE: 77 BPM

## 2025-03-07 VITALS — RESPIRATION RATE: 16 BRPM | HEART RATE: 85 BPM | SYSTOLIC BLOOD PRESSURE: 118 MMHG | DIASTOLIC BLOOD PRESSURE: 74 MMHG

## 2025-03-07 VITALS — RESPIRATION RATE: 12 BRPM | SYSTOLIC BLOOD PRESSURE: 112 MMHG | HEART RATE: 83 BPM | DIASTOLIC BLOOD PRESSURE: 56 MMHG

## 2025-03-07 VITALS — RESPIRATION RATE: 15 BRPM | HEART RATE: 71 BPM

## 2025-03-07 VITALS
RESPIRATION RATE: 18 BRPM | HEART RATE: 84 BPM | OXYGEN SATURATION: 97 % | DIASTOLIC BLOOD PRESSURE: 78 MMHG | SYSTOLIC BLOOD PRESSURE: 123 MMHG

## 2025-03-07 VITALS — RESPIRATION RATE: 16 BRPM | HEART RATE: 74 BPM

## 2025-03-07 VITALS — DIASTOLIC BLOOD PRESSURE: 59 MMHG | SYSTOLIC BLOOD PRESSURE: 93 MMHG

## 2025-03-07 VITALS — HEART RATE: 71 BPM

## 2025-03-07 RX ADMIN — SACUBITRIL AND VALSARTAN 1 EACH: 49; 51 TABLET, FILM COATED ORAL at 08:41

## 2025-03-28 ENCOUNTER — HOSPITAL ENCOUNTER (INPATIENT)
Dept: HOSPITAL 100 - ED | Age: 70
LOS: 1 days | Discharge: ANOTHER HOSPITAL | DRG: 175 | End: 2025-03-29
Payer: MEDICARE

## 2025-03-28 VITALS
OXYGEN SATURATION: 96 % | DIASTOLIC BLOOD PRESSURE: 68 MMHG | SYSTOLIC BLOOD PRESSURE: 102 MMHG | TEMPERATURE: 98 F | HEART RATE: 77 BPM | RESPIRATION RATE: 23 BRPM

## 2025-03-28 VITALS
SYSTOLIC BLOOD PRESSURE: 116 MMHG | HEART RATE: 76 BPM | DIASTOLIC BLOOD PRESSURE: 71 MMHG | RESPIRATION RATE: 18 BRPM | TEMPERATURE: 97.6 F | OXYGEN SATURATION: 94 %

## 2025-03-28 VITALS
SYSTOLIC BLOOD PRESSURE: 111 MMHG | OXYGEN SATURATION: 97 % | HEART RATE: 81 BPM | DIASTOLIC BLOOD PRESSURE: 73 MMHG | RESPIRATION RATE: 16 BRPM

## 2025-03-28 VITALS
DIASTOLIC BLOOD PRESSURE: 65 MMHG | OXYGEN SATURATION: 97 % | SYSTOLIC BLOOD PRESSURE: 113 MMHG | RESPIRATION RATE: 15 BRPM | HEART RATE: 86 BPM | TEMPERATURE: 97.34 F

## 2025-03-28 VITALS
HEART RATE: 74 BPM | RESPIRATION RATE: 23 BRPM | OXYGEN SATURATION: 94 % | DIASTOLIC BLOOD PRESSURE: 70 MMHG | SYSTOLIC BLOOD PRESSURE: 116 MMHG

## 2025-03-28 VITALS
OXYGEN SATURATION: 97 % | SYSTOLIC BLOOD PRESSURE: 113 MMHG | DIASTOLIC BLOOD PRESSURE: 65 MMHG | TEMPERATURE: 97.4 F | RESPIRATION RATE: 15 BRPM | HEART RATE: 86 BPM

## 2025-03-28 VITALS — BODY MASS INDEX: 40.8 KG/M2 | BODY MASS INDEX: 40.3 KG/M2 | BODY MASS INDEX: 41 KG/M2

## 2025-03-28 VITALS
SYSTOLIC BLOOD PRESSURE: 123 MMHG | OXYGEN SATURATION: 97 % | DIASTOLIC BLOOD PRESSURE: 75 MMHG | RESPIRATION RATE: 19 BRPM | HEART RATE: 89 BPM

## 2025-03-28 VITALS — OXYGEN SATURATION: 94 %

## 2025-03-28 VITALS — OXYGEN SATURATION: 96 %

## 2025-03-28 VITALS — RESPIRATION RATE: 16 BRPM | OXYGEN SATURATION: 87 %

## 2025-03-28 DIAGNOSIS — Z79.02: ICD-10-CM

## 2025-03-28 DIAGNOSIS — E66.01: ICD-10-CM

## 2025-03-28 DIAGNOSIS — I26.02: Primary | ICD-10-CM

## 2025-03-28 DIAGNOSIS — I42.8: ICD-10-CM

## 2025-03-28 DIAGNOSIS — I26.92: ICD-10-CM

## 2025-03-28 DIAGNOSIS — Z79.890: ICD-10-CM

## 2025-03-28 DIAGNOSIS — Z79.899: ICD-10-CM

## 2025-03-28 DIAGNOSIS — E78.2: ICD-10-CM

## 2025-03-28 DIAGNOSIS — N18.32: ICD-10-CM

## 2025-03-28 DIAGNOSIS — Z75.1: ICD-10-CM

## 2025-03-28 DIAGNOSIS — E87.20: ICD-10-CM

## 2025-03-28 DIAGNOSIS — F32.A: ICD-10-CM

## 2025-03-28 DIAGNOSIS — R09.02: ICD-10-CM

## 2025-03-28 DIAGNOSIS — I50.42: ICD-10-CM

## 2025-03-28 DIAGNOSIS — E03.9: ICD-10-CM

## 2025-03-28 DIAGNOSIS — R79.89: ICD-10-CM

## 2025-03-28 DIAGNOSIS — I13.0: ICD-10-CM

## 2025-03-28 DIAGNOSIS — K21.9: ICD-10-CM

## 2025-03-28 DIAGNOSIS — Z95.810: ICD-10-CM

## 2025-03-28 DIAGNOSIS — F41.9: ICD-10-CM

## 2025-03-28 LAB
ALANINE AMINOTRANSFER ALT/SGPT: 24 U/L (ref ?–34)
ALBUMIN SERPL-MCNC: 3.4 G/DL (ref 3.4–4.8)
ALKALINE PHOSPHATASE: 198 U/L (ref 35–104)
ANION GAP: 17 (ref 5–15)
ANION GAP: 18 (ref 5–15)
APTT PPP: 28.2 SECONDS (ref 24.1–36.2)
AST(SGOT): 22 U/L (ref ?–31)
BUN SERPL-MCNC: 26 MG/DL (ref 4–19)
BUN SERPL-MCNC: 26 MG/DL (ref 4–19)
BUN/CREAT RATIO: 19 RATIO (ref 10–20)
BUN/CREAT RATIO: 19.5 RATIO (ref 10–20)
CALCIUM SERPL-MCNC: 9.2 MG/DL (ref 7.6–11)
CALCIUM SERPL-MCNC: 9.3 MG/DL (ref 7.6–11)
CARBON DIOXIDE: 11.8 MMOL/L (ref 21–32)
CARBON DIOXIDE: 17.1 MMOL/L (ref 21–32)
CHLORIDE: 106 MMOL/L (ref 98–108)
CHLORIDE: 110 MMOL/L (ref 98–108)
D-DIMER QUANTITATIVE (DVT/PE): 6.17 FEU/UG/M (ref 0.27–0.49)
DEPRECATED RDW RBC: 46.2 FL (ref 35.1–43.9)
ERYTHROCYTE [DISTWIDTH] IN BLOOD: 13.7 % (ref 11.6–14.6)
ESTIMATED CREATININE CLEARANCE: 41.27 ML/MIN (ref 50–250)
GLOBULIN: 3.7 G/DL (ref 2.2–4.2)
GLUCOSE: 112 MG/DL (ref 70–99)
GLUCOSE: 97 MG/DL (ref 70–99)
HCT VFR BLD AUTO: 42.3 % (ref 37–47)
HEMOGLOBIN: 14.4 G/DL (ref 12–15)
HGB BLD-MCNC: 14.4 G/DL (ref 12–15)
IMMATURE GRANULOCYTES COUNT: 0.2 X10^3/UL (ref 0–0)
MCV RBC: 92 FL (ref 81–99)
MEAN CORP HGB CONC: 34 G/DL (ref 32–36)
MEAN PLATELET VOL.: 10.3 FL (ref 6.2–12)
NRBC FLAGGED BY ANALYZER: 0 % (ref 0–5)
PLATELET # BLD: 189 K/MM3 (ref 150–450)
PLATELET COUNT: 189 K/MM3 (ref 150–450)
POTASSIUM: 4 MMOL/L (ref 3.3–5.1)
POTASSIUM: 4.2 MMOL/L (ref 3.3–5.1)
PRO- BRAIN NATRIURETIC PEPTIDE: (no result) PG/ML (ref ?–900)
PROTHROMBIN TIME (PROTIME)PT.: 15 SECONDS (ref 11.7–14.9)
RBC # BLD AUTO: 4.6 M/MM3 (ref 4.2–5.4)
RBC DISTRIBUTION WIDTH CV: 13.7 % (ref 11.6–14.6)
RBC DISTRIBUTION WIDTH SD: 46.2 FL (ref 35.1–43.9)
TROPONIN T HIGH SENSITIVITY: 43 NG/L (ref ?–14)
TROPONIN T HIGH SENSITIVITY: 46 NG/L (ref ?–14)
WBC # BLD AUTO: 10.6 K/MM3 (ref 4.4–11)
WHITE BLOOD COUNT: 10.6 K/MM3 (ref 4.4–11)

## 2025-03-28 PROCEDURE — 93005 ELECTROCARDIOGRAM TRACING: CPT

## 2025-03-28 PROCEDURE — 85730 THROMBOPLASTIN TIME PARTIAL: CPT

## 2025-03-28 PROCEDURE — 87631 RESP VIRUS 3-5 TARGETS: CPT

## 2025-03-28 PROCEDURE — 80053 COMPREHEN METABOLIC PANEL: CPT

## 2025-03-28 PROCEDURE — 80048 BASIC METABOLIC PNL TOTAL CA: CPT

## 2025-03-28 PROCEDURE — 83735 ASSAY OF MAGNESIUM: CPT

## 2025-03-28 PROCEDURE — 87086 URINE CULTURE/COLONY COUNT: CPT

## 2025-03-28 PROCEDURE — 84484 ASSAY OF TROPONIN QUANT: CPT

## 2025-03-28 PROCEDURE — 84100 ASSAY OF PHOSPHORUS: CPT

## 2025-03-28 PROCEDURE — 36415 COLL VENOUS BLD VENIPUNCTURE: CPT

## 2025-03-28 PROCEDURE — 81001 URINALYSIS AUTO W/SCOPE: CPT

## 2025-03-28 PROCEDURE — 71045 X-RAY EXAM CHEST 1 VIEW: CPT

## 2025-03-28 PROCEDURE — 94668 MNPJ CHEST WALL SBSQ: CPT

## 2025-03-28 PROCEDURE — 93306 TTE W/DOPPLER COMPLETE: CPT

## 2025-03-28 PROCEDURE — 87077 CULTURE AEROBIC IDENTIFY: CPT

## 2025-03-28 PROCEDURE — 85025 COMPLETE CBC W/AUTO DIFF WBC: CPT

## 2025-03-28 PROCEDURE — 87088 URINE BACTERIA CULTURE: CPT

## 2025-03-28 PROCEDURE — A4216 STERILE WATER/SALINE, 10 ML: HCPCS

## 2025-03-28 PROCEDURE — 99285 EMERGENCY DEPT VISIT HI MDM: CPT

## 2025-03-28 PROCEDURE — 83880 ASSAY OF NATRIURETIC PEPTIDE: CPT

## 2025-03-28 PROCEDURE — C8929 TTE W OR WO FOL WCON,DOPPLER: HCPCS

## 2025-03-28 PROCEDURE — 85379 FIBRIN DEGRADATION QUANT: CPT

## 2025-03-28 PROCEDURE — 71275 CT ANGIOGRAPHY CHEST: CPT

## 2025-03-28 PROCEDURE — 85610 PROTHROMBIN TIME: CPT

## 2025-03-28 PROCEDURE — 87186 SC STD MICRODIL/AGAR DIL: CPT

## 2025-03-28 RX ADMIN — HEPARIN SODIUM 7500 UNIT: 5000 INJECTION, SOLUTION INTRAVENOUS; SUBCUTANEOUS at 17:22

## 2025-03-28 RX ADMIN — HEPARIN SODIUM 14 UNITS: 10000 INJECTION, SOLUTION INTRAVENOUS at 17:22

## 2025-03-29 VITALS
OXYGEN SATURATION: 94 % | DIASTOLIC BLOOD PRESSURE: 70 MMHG | SYSTOLIC BLOOD PRESSURE: 113 MMHG | RESPIRATION RATE: 16 BRPM | HEART RATE: 83 BPM | TEMPERATURE: 97.52 F

## 2025-03-29 VITALS
SYSTOLIC BLOOD PRESSURE: 102 MMHG | TEMPERATURE: 97.7 F | OXYGEN SATURATION: 94 % | RESPIRATION RATE: 16 BRPM | DIASTOLIC BLOOD PRESSURE: 66 MMHG | HEART RATE: 79 BPM

## 2025-03-29 VITALS
HEART RATE: 81 BPM | TEMPERATURE: 98.6 F | OXYGEN SATURATION: 93 % | DIASTOLIC BLOOD PRESSURE: 66 MMHG | SYSTOLIC BLOOD PRESSURE: 102 MMHG | RESPIRATION RATE: 16 BRPM

## 2025-03-29 VITALS
SYSTOLIC BLOOD PRESSURE: 102 MMHG | TEMPERATURE: 96.2 F | DIASTOLIC BLOOD PRESSURE: 50 MMHG | RESPIRATION RATE: 18 BRPM | HEART RATE: 76 BPM | OXYGEN SATURATION: 95 %

## 2025-03-29 VITALS — OXYGEN SATURATION: 90 %

## 2025-03-29 VITALS — OXYGEN SATURATION: 91 %

## 2025-03-29 VITALS — SYSTOLIC BLOOD PRESSURE: 86 MMHG | DIASTOLIC BLOOD PRESSURE: 53 MMHG

## 2025-03-29 LAB
ALANINE AMINOTRANSFER ALT/SGPT: 18 U/L (ref ?–34)
ALBUMIN SERPL-MCNC: 3.2 G/DL (ref 3.4–4.8)
ALKALINE PHOSPHATASE: 174 U/L (ref 35–104)
ANION GAP: 13 (ref 5–15)
APTT PPP: 184.4 SECONDS (ref 24.1–36.2)
APTT PPP: 63.1 SECONDS (ref 24.1–36.2)
APTT PPP: 69.4 SECONDS (ref 24.1–36.2)
AST(SGOT): 13 U/L (ref ?–31)
BUN SERPL-MCNC: 24 MG/DL (ref 4–19)
BUN/CREAT RATIO: 19.6 RATIO (ref 10–20)
CALCIUM SERPL-MCNC: 9.1 MG/DL (ref 7.6–11)
CARBON DIOXIDE: 17.8 MMOL/L (ref 21–32)
CHLORIDE: 108 MMOL/L (ref 98–108)
DEPRECATED RDW RBC: 46.3 FL (ref 35.1–43.9)
ERYTHROCYTE [DISTWIDTH] IN BLOOD: 13.8 % (ref 11.6–14.6)
ESTIMATED CREATININE CLEARANCE: 45.46 ML/MIN (ref 50–250)
GLOBULIN: 3.2 G/DL (ref 2.2–4.2)
GLUCOSE: 105 MG/DL (ref 70–99)
HCT VFR BLD AUTO: 39.6 % (ref 37–47)
HEMOGLOBIN: 13.3 G/DL (ref 12–15)
HGB BLD-MCNC: 13.3 G/DL (ref 12–15)
IMMATURE GRANULOCYTES COUNT: 0.19 X10^3/UL (ref 0–0)
LEUKOCYTE ESTERASE UR QL STRIP: 500 /UL
MAGNESIUM: 2.3 MG/DL (ref 1.5–2.2)
MCV RBC: 93.4 FL (ref 81–99)
MEAN CORP HGB CONC: 33.6 G/DL (ref 32–36)
MEAN PLATELET VOL.: 10.8 FL (ref 6.2–12)
MUCOUS THREADS URNS QL MICRO: (no result) /HPF
NRBC FLAGGED BY ANALYZER: 0 % (ref 0–5)
PLATELET # BLD: 167 K/MM3 (ref 150–450)
PLATELET COUNT: 167 K/MM3 (ref 150–450)
POTASSIUM: 3.7 MMOL/L (ref 3.3–5.1)
PROT UR QL STRIP.AUTO: 500 MG/DL
PROTHROMBIN TIME (PROTIME)PT.: 15.5 SECONDS (ref 11.7–14.9)
RBC # BLD AUTO: 4.24 M/MM3 (ref 4.2–5.4)
RBC DISTRIBUTION WIDTH CV: 13.8 % (ref 11.6–14.6)
RBC DISTRIBUTION WIDTH SD: 46.3 FL (ref 35.1–43.9)
RBC UR QL: (no result) /HPF (ref 0–5)
RBC UR QL: 10 /UL
SP GR UR: 1.01 (ref 1–1.03)
SQUAMOUS URNS QL MICRO: (no result) /HPF (ref 5–10)
URINE PRESERVATIVE: (no result)
WBC # BLD AUTO: 8.9 K/MM3 (ref 4.4–11)
WHITE BLOOD COUNT: 8.9 K/MM3 (ref 4.4–11)

## 2025-03-29 RX ADMIN — SODIUM CHLORIDE, PRESERVATIVE FREE 0 ML: 5 INJECTION INTRAVENOUS at 09:38

## 2025-03-29 RX ADMIN — HEPARIN SODIUM 11 UNITS: 10000 INJECTION, SOLUTION INTRAVENOUS at 15:55

## 2025-05-12 ENCOUNTER — HOSPITAL ENCOUNTER (INPATIENT)
Dept: HOSPITAL 100 - ED | Age: 70
LOS: 2 days | Discharge: ANOTHER HOSPITAL | DRG: 314 | End: 2025-05-14
Payer: MEDICARE

## 2025-05-12 VITALS — BODY MASS INDEX: 40.3 KG/M2

## 2025-05-12 VITALS — OXYGEN SATURATION: 95 %

## 2025-05-12 VITALS
RESPIRATION RATE: 15 BRPM | OXYGEN SATURATION: 99 % | HEART RATE: 75 BPM | DIASTOLIC BLOOD PRESSURE: 71 MMHG | TEMPERATURE: 97.7 F | SYSTOLIC BLOOD PRESSURE: 115 MMHG

## 2025-05-12 VITALS
RESPIRATION RATE: 15 BRPM | DIASTOLIC BLOOD PRESSURE: 62 MMHG | OXYGEN SATURATION: 97 % | SYSTOLIC BLOOD PRESSURE: 110 MMHG | HEART RATE: 66 BPM

## 2025-05-12 VITALS
DIASTOLIC BLOOD PRESSURE: 86 MMHG | SYSTOLIC BLOOD PRESSURE: 97 MMHG | RESPIRATION RATE: 14 BRPM | OXYGEN SATURATION: 95 % | HEART RATE: 69 BPM

## 2025-05-12 VITALS
OXYGEN SATURATION: 98 % | DIASTOLIC BLOOD PRESSURE: 68 MMHG | RESPIRATION RATE: 14 BRPM | TEMPERATURE: 97.8 F | SYSTOLIC BLOOD PRESSURE: 133 MMHG | HEART RATE: 61 BPM

## 2025-05-12 VITALS
DIASTOLIC BLOOD PRESSURE: 66 MMHG | SYSTOLIC BLOOD PRESSURE: 112 MMHG | HEART RATE: 65 BPM | RESPIRATION RATE: 11 BRPM | OXYGEN SATURATION: 96 %

## 2025-05-12 VITALS
HEART RATE: 91 BPM | SYSTOLIC BLOOD PRESSURE: 104 MMHG | DIASTOLIC BLOOD PRESSURE: 76 MMHG | BODY MASS INDEX: 40.4 KG/M2 | TEMPERATURE: 96.98 F | OXYGEN SATURATION: 97 % | RESPIRATION RATE: 18 BRPM

## 2025-05-12 VITALS
DIASTOLIC BLOOD PRESSURE: 96 MMHG | SYSTOLIC BLOOD PRESSURE: 119 MMHG | HEART RATE: 67 BPM | OXYGEN SATURATION: 95 % | RESPIRATION RATE: 15 BRPM

## 2025-05-12 VITALS
HEART RATE: 66 BPM | OXYGEN SATURATION: 97 % | RESPIRATION RATE: 15 BRPM | TEMPERATURE: 98.4 F | SYSTOLIC BLOOD PRESSURE: 105 MMHG | DIASTOLIC BLOOD PRESSURE: 62 MMHG

## 2025-05-12 VITALS
RESPIRATION RATE: 13 BRPM | HEART RATE: 66 BPM | SYSTOLIC BLOOD PRESSURE: 107 MMHG | OXYGEN SATURATION: 96 % | DIASTOLIC BLOOD PRESSURE: 66 MMHG

## 2025-05-12 VITALS
HEART RATE: 68 BPM | OXYGEN SATURATION: 95 % | DIASTOLIC BLOOD PRESSURE: 68 MMHG | SYSTOLIC BLOOD PRESSURE: 111 MMHG | RESPIRATION RATE: 16 BRPM

## 2025-05-12 VITALS
OXYGEN SATURATION: 97 % | SYSTOLIC BLOOD PRESSURE: 105 MMHG | HEART RATE: 66 BPM | DIASTOLIC BLOOD PRESSURE: 62 MMHG | RESPIRATION RATE: 15 BRPM

## 2025-05-12 VITALS
HEART RATE: 77 BPM | SYSTOLIC BLOOD PRESSURE: 108 MMHG | RESPIRATION RATE: 20 BRPM | OXYGEN SATURATION: 95 % | DIASTOLIC BLOOD PRESSURE: 65 MMHG

## 2025-05-12 VITALS — HEART RATE: 77 BPM

## 2025-05-12 DIAGNOSIS — F41.9: ICD-10-CM

## 2025-05-12 DIAGNOSIS — E78.5: ICD-10-CM

## 2025-05-12 DIAGNOSIS — Z79.01: ICD-10-CM

## 2025-05-12 DIAGNOSIS — E03.9: ICD-10-CM

## 2025-05-12 DIAGNOSIS — I42.8: Primary | ICD-10-CM

## 2025-05-12 DIAGNOSIS — E66.813: ICD-10-CM

## 2025-05-12 DIAGNOSIS — F32.A: ICD-10-CM
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
## 2025-05-12 DIAGNOSIS — Z86.718: ICD-10-CM

## 2025-05-12 DIAGNOSIS — Z79.899: ICD-10-CM

## 2025-05-12 DIAGNOSIS — N18.30: ICD-10-CM

## 2025-05-12 DIAGNOSIS — T82.198A: ICD-10-CM

## 2025-05-12 DIAGNOSIS — I26.99: ICD-10-CM

## 2025-05-12 DIAGNOSIS — Z95.810: ICD-10-CM

## 2025-05-12 DIAGNOSIS — I13.0: ICD-10-CM

## 2025-05-12 DIAGNOSIS — I50.20: ICD-10-CM

## 2025-05-12 DIAGNOSIS — I82.409: ICD-10-CM

## 2025-05-12 LAB
ANION GAP SERPL CALC-SCNC: 10 MMOL/L (ref 5–15)
BUN SERPL-MCNC: 18 MG/DL (ref 4–19)
BUN/CREAT SERPL: 16.5 RATIO (ref 10–20)
CALCIUM SERPL-MCNC: 8.6 MG/DL (ref 7.6–11)
CHLORIDE: 112 MMOL/L (ref 98–108)
CO2 BLDCV-SCNC: 15.2 MMOL/L (ref 21–32)
DEPRECATED RDW RBC: 50.7 FL (ref 35.1–43.9)
ERYTHROCYTE [DISTWIDTH] IN BLOOD: 14.4 % (ref 11.6–14.6)
ESTIMATED CREATININE CLEARANCE: 48.03 ML/MIN (ref 50–250)
GLUCOSE SERPL-MCNC: 95 MG/DL (ref 70–99)
HCT VFR BLD AUTO: 45.1 % (ref 37–47)
HGB BLD-MCNC: 14.8 G/DL (ref 12–15)
MAGNESIUM SPEC-MCNC: 2.4 MG/DL (ref 1.5–2.2)
MCV RBC: 95.6 FL (ref 81–99)
MEAN CORP HGB CONC: 32.8 G/DL (ref 32–36)
MEAN PLATELET VOL.: 9.5 FL (ref 6.2–12)
NRBC FLAGGED BY ANALYZER: 0 % (ref 0–5)
PLATELET # BLD: 201 K/MM3 (ref 150–450)
POTASSIUM SPEC-MCNC: 5.8 MMOL/L (ref 3.3–5.1)
PROTHROMBIN TIME (PROTIME)PT.: 14.1 SECONDS (ref 11.7–14.9)
RBC # BLD AUTO: 4.72 M/MM3 (ref 4.2–5.4)

## 2025-05-12 PROCEDURE — 85730 THROMBOPLASTIN TIME PARTIAL: CPT

## 2025-05-12 PROCEDURE — A4216 STERILE WATER/SALINE, 10 ML: HCPCS

## 2025-05-12 PROCEDURE — 85610 PROTHROMBIN TIME: CPT

## 2025-05-12 PROCEDURE — 93005 ELECTROCARDIOGRAM TRACING: CPT

## 2025-05-12 PROCEDURE — 99284 EMERGENCY DEPT VISIT MOD MDM: CPT

## 2025-05-12 PROCEDURE — 71045 X-RAY EXAM CHEST 1 VIEW: CPT

## 2025-05-12 PROCEDURE — 85025 COMPLETE CBC W/AUTO DIFF WBC: CPT

## 2025-05-12 PROCEDURE — 36415 COLL VENOUS BLD VENIPUNCTURE: CPT

## 2025-05-12 PROCEDURE — 94668 MNPJ CHEST WALL SBSQ: CPT

## 2025-05-12 PROCEDURE — 80053 COMPREHEN METABOLIC PANEL: CPT

## 2025-05-12 PROCEDURE — 80048 BASIC METABOLIC PNL TOTAL CA: CPT

## 2025-05-12 PROCEDURE — 83735 ASSAY OF MAGNESIUM: CPT

## 2025-05-12 RX ADMIN — HEPARIN SODIUM 14 UNITS: 10000 INJECTION, SOLUTION INTRAVENOUS at 20:52

## 2025-05-12 RX ADMIN — SACUBITRIL AND VALSARTAN 1 EACH: 49; 51 TABLET, FILM COATED ORAL at 20:50

## 2025-05-13 VITALS — HEART RATE: 68 BPM | OXYGEN SATURATION: 97 %

## 2025-05-13 VITALS
TEMPERATURE: 98 F | SYSTOLIC BLOOD PRESSURE: 106 MMHG | OXYGEN SATURATION: 95 % | HEART RATE: 72 BPM | RESPIRATION RATE: 14 BRPM | DIASTOLIC BLOOD PRESSURE: 63 MMHG

## 2025-05-13 VITALS — OXYGEN SATURATION: 95 %

## 2025-05-13 VITALS
SYSTOLIC BLOOD PRESSURE: 90 MMHG | RESPIRATION RATE: 14 BRPM | HEART RATE: 78 BPM | DIASTOLIC BLOOD PRESSURE: 54 MMHG | OXYGEN SATURATION: 98 % | TEMPERATURE: 98.96 F

## 2025-05-13 VITALS
HEART RATE: 68 BPM | DIASTOLIC BLOOD PRESSURE: 65 MMHG | OXYGEN SATURATION: 97 % | SYSTOLIC BLOOD PRESSURE: 102 MMHG | TEMPERATURE: 97.8 F | RESPIRATION RATE: 16 BRPM

## 2025-05-13 VITALS
HEART RATE: 66 BPM | OXYGEN SATURATION: 95 % | DIASTOLIC BLOOD PRESSURE: 53 MMHG | RESPIRATION RATE: 18 BRPM | SYSTOLIC BLOOD PRESSURE: 101 MMHG | TEMPERATURE: 96.8 F

## 2025-05-13 VITALS — HEART RATE: 63 BPM

## 2025-05-13 LAB
ALBUMIN SERPL-MCNC: 3.5 G/DL (ref 3.4–4.8)
ALP SERPL-CCNC: 63 U/L (ref 35–104)
ALT SERPL-CCNC: 11 U/L (ref ?–34)
ANION GAP SERPL CALC-SCNC: 10 MMOL/L (ref 5–15)
APTT PPP: 143.4 SECONDS (ref 24.1–36.2)
APTT PPP: 71.1 SECONDS (ref 24.1–36.2)
APTT PPP: 96.5 SECONDS (ref 24.1–36.2)
AST(SGOT): 14 U/L (ref ?–31)
BUN SERPL-MCNC: 17 MG/DL (ref 4–19)
CALCIUM SERPL-MCNC: 8.9 MG/DL (ref 7.6–11)
CHLORIDE: 111 MMOL/L (ref 98–108)
CO2 BLDCV-SCNC: 18.8 MMOL/L (ref 21–32)
DEPRECATED RDW RBC: 50.2 FL (ref 35.1–43.9)
ERYTHROCYTE [DISTWIDTH] IN BLOOD: 14.2 % (ref 11.6–14.6)
GLOBULIN: 2.1 G/DL (ref 2.2–4.2)
GLUCOSE SERPL-MCNC: 110 MG/DL (ref 70–99)
HCT VFR BLD AUTO: 39.4 % (ref 37–47)
HGB BLD-MCNC: 12.8 G/DL (ref 12–15)
MCV RBC: 96.6 FL (ref 81–99)
MEAN CORP HGB CONC: 32.5 G/DL (ref 32–36)
MEAN PLATELET VOL.: 9.1 FL (ref 6.2–12)
NRBC FLAGGED BY ANALYZER: 0 % (ref 0–5)
PLATELET # BLD: 134 K/MM3 (ref 150–450)
POTASSIUM SPEC-MCNC: 3.9 MMOL/L (ref 3.3–5.1)
RBC # BLD AUTO: 4.08 M/MM3 (ref 4.2–5.4)
WBC # BLD AUTO: 4.9 K/MM3 (ref 4.4–11)

## 2025-05-13 RX ADMIN — SACUBITRIL AND VALSARTAN 1 EACH: 49; 51 TABLET, FILM COATED ORAL at 08:15

## 2025-05-13 RX ADMIN — SODIUM CHLORIDE, PRESERVATIVE FREE 0 ML: 5 INJECTION INTRAVENOUS at 14:11

## 2025-05-13 RX ADMIN — SACUBITRIL AND VALSARTAN 1 EACH: 49; 51 TABLET, FILM COATED ORAL at 20:58

## 2025-05-13 RX ADMIN — HEPARIN SODIUM 9 UNITS: 10000 INJECTION, SOLUTION INTRAVENOUS at 23:15

## 2025-05-16 ENCOUNTER — APPOINTMENT (OUTPATIENT)
Dept: PHYSICAL THERAPY | Facility: CLINIC | Age: 70
End: 2025-05-16
Payer: MEDICARE

## 2025-06-26 ENCOUNTER — HOSPITAL ENCOUNTER (OUTPATIENT)
Dept: HOSPITAL 100 - RAD | Age: 70
Discharge: HOME | End: 2025-06-26
Payer: MEDICARE

## 2025-06-26 DIAGNOSIS — I42.8: ICD-10-CM

## 2025-06-26 DIAGNOSIS — G89.18: ICD-10-CM

## 2025-06-26 DIAGNOSIS — Z95.810: Primary | ICD-10-CM

## 2025-06-26 PROCEDURE — 71046 X-RAY EXAM CHEST 2 VIEWS: CPT

## 2025-07-14 ENCOUNTER — EVALUATION (OUTPATIENT)
Dept: PHYSICAL THERAPY | Facility: CLINIC | Age: 70
End: 2025-07-14
Payer: MEDICARE

## 2025-07-14 DIAGNOSIS — M25.561 KNEE PAIN, RIGHT: ICD-10-CM

## 2025-07-14 DIAGNOSIS — G89.29 CHRONIC PAIN OF RIGHT KNEE: Primary | ICD-10-CM

## 2025-07-14 DIAGNOSIS — M25.561 CHRONIC PAIN OF RIGHT KNEE: Primary | ICD-10-CM

## 2025-07-14 PROCEDURE — 97110 THERAPEUTIC EXERCISES: CPT | Mod: GP

## 2025-07-14 PROCEDURE — 97161 PT EVAL LOW COMPLEX 20 MIN: CPT | Mod: GP

## 2025-07-14 ASSESSMENT — ENCOUNTER SYMPTOMS
DEPRESSION: 0
LOSS OF SENSATION IN FEET: 0
OCCASIONAL FEELINGS OF UNSTEADINESS: 0

## 2025-07-14 ASSESSMENT — PAIN DESCRIPTION - DESCRIPTORS: DESCRIPTORS: ACHING;BURNING

## 2025-07-14 ASSESSMENT — PAIN SCALES - GENERAL: PAINLEVEL_OUTOF10: 5 - MODERATE PAIN

## 2025-07-14 ASSESSMENT — PAIN - FUNCTIONAL ASSESSMENT: PAIN_FUNCTIONAL_ASSESSMENT: 0-10

## 2025-07-14 ASSESSMENT — PATIENT HEALTH QUESTIONNAIRE - PHQ9
2. FEELING DOWN, DEPRESSED OR HOPELESS: NOT AT ALL
SUM OF ALL RESPONSES TO PHQ9 QUESTIONS 1 AND 2: 1
1. LITTLE INTEREST OR PLEASURE IN DOING THINGS: SEVERAL DAYS

## 2025-07-14 NOTE — PROGRESS NOTES
"Physical Therapy          Physical Therapy Evaluation          Patient Name: Shanta Bonds     MRN: [id number]     : 1955     Referring Physician: Pompey,Breanne Renee     Today's Date: 2025     Time Calculation  Start Time: 161  Stop Time: 1653  Time Calculation (min): 38 min     PT Evaluation Time Entry  PT Evaluation (Low) Time Entry: 25     PT Therapeutic Procedures Time Entry  Therapeutic Exercise Time Entry: 13                       General     Reason for Referral: R knee pain  Referred By: Breanne Pompey, PA-C  General Comment: Eval          Current Problem     Problem List Items Addressed This Visit           ICD-10-CM       Musculoskeletal and Injuries    Knee pain, right - Primary M25.561    Relevant Orders    Follow Up In Physical Therapy                SUBJECTIVE     Subjective      Patient reports R knee pain that began in 2024 . Pt reports that her knee is \"bone on bone\". This is leading to difficulty with ambulation, ADLs, hobbies.  Pain is reported to range 6-10/10 with daily activities.  Patient states that their goal is to decrease pain and increase mobility with physical therapy intervention. Pt has plans of having a R TKA soon.          Prior level of function: MI          Patient's name and  were confirmed this date.               Precautions     Precautions  STEADI Fall Risk Score (The score of 4 or more indicates an increased risk of falling): 3  Precautions Comment: Defibrillator/pacemaker, DVT                Pain     Pain Assessment: 0-10  0-10 (Numeric) Pain Score: 5 - Moderate pain  Pain Type: Chronic pain  Pain Location: Knee  Pain Orientation: Right  Pain Descriptors: Aching, Burning  Pain Frequency: Constant/continuous  Pain Onset: Gradual  Clinical Progression: Gradually worsening             OBJECTIVE:        Lower Extremity Strength:?   MMT 5/5 max??  RIGHT?  LEFT?    Hip Abduction?  ? 4 ?    Hip ER?  ?  ?    Hip IR?  ?  ?    Hip Flexion?  ?? 4- ??  " "  Hip Ext.?  ??  ??    ?Hip Add.?  ??  ??    ?Knee Flexion?  ?? 4 ??    ?Knee Ext.??  ?? 4- ??    Dorsiflexion?  ?  ?    Plantar Flexion?  ?  ?    Eversion?  ?  ?    Inversion?  ?  ?    ?  ?  ?    ?   Lower Extremity ROM:?   ?  RIGHT?  LEFT?    Hip Abduction?  ? ° ?    Hip ER?  ?  ?    Hip IR?  ?  ?    Hip Flexion?  ??  ??    Hip Ext.?  ??  ??    ?Hip Add.?  ??  ??    ?Knee Flexion?  ?? 99° ??    ?Knee Ext.??  ?? Lacking 5° ??    Dorsiflexion?  ?  ?    Plantar Flexion?  ?  ?    Eversion?  ?  ?    Inversion?  ?  ?    ?  ?  ?        Gait mechanics: Pt ambulates c rollator c crouched posturing, decreased step length, decreased joint excursion          Palpation: TTP of medial and lateral joint lines          Special tests:   Christopher: discomfort  Varus stress: no obvious laxity and no increased pain  Valgus stress: no obvious laxity and no increased pain         Other Measures  Lower Extremity Funtional Score (LEFS): 27             TREATMENT:     EXERCISES   Heel slides x10  Heel prop QS x10  Seated foot slide 5x5\"  LAQ x10    HEP    Access Code: [email or web address]  URL: [email or web address]  Date: 07/14/2025  Prepared by: Cheikh Ward    Exercises  - Supine Heel Slide  - 2 x daily - 7 x weekly - 1-2 sets - 10-15 reps - 5\" hold  - Quad Setting and Stretching  - 2 x daily - 7 x weekly - 1-2 sets - 10-15 reps - 3\" hold  - Seated Passive Knee Extension  - 2 x daily - 7 x weekly - 1 sets - 1-2 reps - 1-5 minutes hold  - Seated Heel Slide  - 2 x daily - 7 x weekly - 1-2 sets - 10-15 reps - 5\" hold  - Seated Heel Toe Raises  - 3 x daily - 7 x weekly - 1-2 sets - 20-30 reps         Manual   Grade 1-2 flexion and extension joint mobilizations x30\" each          PATIENT EDUCATION:   Outpatient Education  Individual(s) Educated: Patient  Education Provided: Anatomy, Body Mechanics, Home Exercise Program, POC, Posture  Risk and Benefits Discussed with Patient/Caregiver/Other: yes  Patient/Caregiver Demonstrated " Understanding: yes  Plan of Care Discussed and Agreed Upon: yes  Patient Response to Education: Patient/Caregiver Verbalized Understanding of Information            ASSESSEMENT     PT Assessment  PT Assessment Results: Decreased strength, Decreased range of motion, Decreased mobility, Pain  Rehab Prognosis: Fair  Evaluation/Treatment Tolerance: Patient limited by pain    Pt presents due to a multiple month history of R knee pain c decreased AROM, decreased strength, abnormal gait mechanics, LEFS of 27/80, indicating the need for PT services to return to PLOF.          Rehab potential: Fair          PLAN     Treatment/Interventions: Blood flow restriction therapy, Cryotherapy, Education/ Instruction, Gait training, Electrical stimulation, Manual therapy, Therapeutic activities, Therapeutic exercises, Taping techniques  PT Plan: Skilled PT  PT Frequency: 2 times per week (1-2x/week)  Duration: 4 weeks  Onset Date: 12/15/24  Certification Period Start Date: 07/14/25  Rehab Potential: Fair  Plan of Care Agreement: Patient          Pt. Is in agreement with PT plan.     Goals:        Active       PT Problem       PT Goal 1       Start:  07/14/25    Expected End:  10/12/25       Pt will be independent c HEP for progression of strength and functional mobility, in 2 weeks.           PT Goal 2       Start:  07/14/25    Expected End:  10/12/25       Pt will have <=2/10 pain for one week, for improved QOL, in 3 weeks.         PT Goal 3       Start:  07/14/25    Expected End:  10/12/25       Pt will increase AROM of R knee to 0°-115° for improved gait and mobility mechanics, in 4 weeks.         PT Goal 4       Start:  07/14/25    Expected End:  10/12/25       Pt will increase RLE strength to >=4+/5 or greater for improved gait and mobility efficiency, in 4 weeks.            PT Goal 5       Start:  07/14/25    Expected End:  10/12/25       Pt will improve LEFS to >=36/80, indicating improved functional mobility, in 4 weeks.            Patient Stated Goal 1       Start:  07/14/25    Expected End:  10/12/25       Patient states that their goal is to decrease pain and increase mobility with physical therapy intervention.

## 2025-07-18 ENCOUNTER — TREATMENT (OUTPATIENT)
Dept: PHYSICAL THERAPY | Facility: CLINIC | Age: 70
End: 2025-07-18
Payer: MEDICARE

## 2025-07-18 DIAGNOSIS — M25.561 KNEE PAIN, RIGHT: ICD-10-CM

## 2025-07-18 PROCEDURE — 97110 THERAPEUTIC EXERCISES: CPT | Mod: GP,CQ

## 2025-07-18 ASSESSMENT — PAIN SCALES - GENERAL: PAINLEVEL_OUTOF10: 5 - MODERATE PAIN

## 2025-07-18 ASSESSMENT — PAIN - FUNCTIONAL ASSESSMENT: PAIN_FUNCTIONAL_ASSESSMENT: 0-10

## 2025-07-18 ASSESSMENT — PAIN DESCRIPTION - DESCRIPTORS: DESCRIPTORS: ACHING

## 2025-07-18 NOTE — PROGRESS NOTES
"Physical Therapy Treatment    Patient Name: Shanta Bonds  MRN: [id number]  Today's Date: 7/18/2025  Time Calculation  Start Time: 1132  Stop Time: 1212  Time Calculation (min): 40 min  PT Therapeutic Procedures Time Entry  Therapeutic Exercise Time Entry: 38         Current Problem  Problem List Items Addressed This Visit           ICD-10-CM    Knee pain, right M25.561       Subjective   Pt.'s name and birthday confirmed.  Pt. Reports compliance with HEP.  Pt. Has 5/10 pain reported with right knee.  No c/o recent falls.    Reason for Referral: R knee pain  Referred By: Breanne Pompey, PA-C  General Comment: 2      Precautions  Precautions  Precautions Comment: Defibrillator/pacemaker, DVT      Pain  Pain Assessment: 0-10  0-10 (Numeric) Pain Score: 5 - Moderate pain  Pain Type: Chronic pain  Pain Location: Knee  Pain Orientation: Right  Pain Descriptors: Aching    Objective:  Pt. With antalgic gait right LE    Treatments:  Supine Heel slides x10  Supine Heel prop QS x10  Supine SAQ's x10 (N)  Supine ball squeezes x10  3\" hold (N)  Supine clamshells orange band x10 (N)  Seated HR/TR's x10 ea (N)  Seated foot slide 2 x 5 reps  x5\"hold (P)  LAQ x10   Slant board  2 x30\" (N)  Step ups 4\" x5 (N)    Manual   Grade 1-2 flexion and extension joint mobilizations x30\" each (X)         OP EDUCATION:  Access Code: [email or web address]  URL: [email or web address]  Date: 07/14/2025  Prepared by: Cheikh Ward     Exercises  - Supine Heel Slide  - 2 x daily - 7 x weekly - 1-2 sets - 10-15 reps - 5\" hold  - Quad Setting and Stretching  - 2 x daily - 7 x weekly - 1-2 sets - 10-15 reps - 3\" hold  - Seated Passive Knee Extension  - 2 x daily - 7 x weekly - 1 sets - 1-2 reps - 1-5 minutes hold  - Seated Heel Slide  - 2 x daily - 7 x weekly - 1-2 sets - 10-15 reps - 5\" hold  - Seated Heel Toe Raises  - 3 x daily - 7 x weekly - 1-2 sets - 20-30 reps           Assessment:   Pt. Has c/o shin pain as well as behind the knee. "  Verbal cues needed with TE which patient tolerated fairly well.  Fatigue noted and increased discomfort noted with step ups.   Handout provided and reviewed with patient.              Plan:  Continue with POC and progress as able to improve ease with climbing stairs and with ambulation with little to no difficulty.  MB       OP PT Plan  Treatment/Interventions: Blood flow restriction therapy, Cryotherapy, Education/ Instruction, Gait training, Electrical stimulation, Manual therapy, Therapeutic activities, Therapeutic exercises, Taping techniques  PT Plan: Skilled PT  PT Frequency: 2 times per week (1-2x/week)  Duration: 4 weeks  Onset Date: 12/15/24  Certification Period Start Date: 07/14/25  Rehab Potential: Fair  Plan of Care Agreement: Patient              Goals:  Active       PT Problem       PT Goal 1       Start:  07/14/25    Expected End:  10/12/25       Pt will be independent c HEP for progression of strength and functional mobility, in 2 weeks.           PT Goal 2       Start:  07/14/25    Expected End:  10/12/25       Pt will have <=2/10 pain for one week, for improved QOL, in 3 weeks.         PT Goal 3       Start:  07/14/25    Expected End:  10/12/25       Pt will increase AROM of R knee to 0°-115° for improved gait and mobility mechanics, in 4 weeks.         PT Goal 4       Start:  07/14/25    Expected End:  10/12/25       Pt will increase RLE strength to >=4+/5 or greater for improved gait and mobility efficiency, in 4 weeks.            PT Goal 5       Start:  07/14/25    Expected End:  10/12/25       Pt will improve LEFS to >=36/80, indicating improved functional mobility, in 4 weeks.           Patient Stated Goal 1       Start:  07/14/25    Expected End:  10/12/25       Patient states that their goal is to decrease pain and increase mobility with physical therapy intervention.

## 2025-07-21 ENCOUNTER — HOSPITAL ENCOUNTER (OUTPATIENT)
Dept: HOSPITAL 100 - CVS | Age: 70
Discharge: HOME | End: 2025-07-21
Payer: MEDICARE

## 2025-07-21 DIAGNOSIS — M79.604: Primary | ICD-10-CM

## 2025-07-21 PROCEDURE — 93971 EXTREMITY STUDY: CPT

## 2025-07-23 ENCOUNTER — TREATMENT (OUTPATIENT)
Dept: PHYSICAL THERAPY | Facility: CLINIC | Age: 70
End: 2025-07-23
Payer: MEDICARE

## 2025-07-23 DIAGNOSIS — M25.561 KNEE PAIN, RIGHT: ICD-10-CM

## 2025-07-23 PROCEDURE — 97110 THERAPEUTIC EXERCISES: CPT | Mod: GP,CQ

## 2025-07-23 ASSESSMENT — PAIN - FUNCTIONAL ASSESSMENT: PAIN_FUNCTIONAL_ASSESSMENT: 0-10

## 2025-07-23 ASSESSMENT — PAIN DESCRIPTION - DESCRIPTORS: DESCRIPTORS: ACHING

## 2025-07-23 ASSESSMENT — PAIN SCALES - GENERAL: PAINLEVEL_OUTOF10: 6

## 2025-07-23 NOTE — PROGRESS NOTES
"Physical Therapy Treatment    Patient Name: Shanta Bonds  MRN: [id number]  Today's Date: 7/23/2025  Time Calculation  Start Time: 1129  Stop Time: 1208  Time Calculation (min): 39 min  PT Therapeutic Procedures Time Entry  Therapeutic Exercise Time Entry: 38         Current Problem  Problem List Items Addressed This Visit           ICD-10-CM    Knee pain, right M25.561       Subjective   Pt.'s name and birthday confirmed.  Pt. Reports compliance with HEP.  Pt. Has 6/10 pain when walking.  Pt. States everything hurts.  No c/o recent falls.    Reason for Referral: R knee pain  Referred By: Breanne Pompey, PA-C  General Comment: 3      Precautions  Precautions  Precautions Comment: Defibrillator/pacemaker, DVT      Pain  Pain Assessment: 0-10  0-10 (Numeric) Pain Score: 6  Pain Type: Chronic pain  Pain Location: Knee  Pain Orientation: Right  Pain Descriptors: Aching    Objective:  Forward trunk flexion noted with gait.    Treatments:  Supine Heel slides x10  Supine Heel prop QS 2 x10  3\" hold   Supine SAQ's 2 x10 (P)  Supine SLR's x10 (N)  Supine ball squeezes 2 x10  3\" hold (P)  Supine clamshells orange band 2 x10 (P)  Seated HR/TR's x20 ea (P)  Seated marches, alt x10 (N)  Seated HS curls orange band x10 (N)  Seated foot slide 2 x 5 reps  x5\"hold   LAQ 2 x10 (P)  Slant board  2 x30\"   Step ups 4\" x5 (X)     Manual   Grade 1-2 flexion and extension joint mobilizations x30\" each (X)           OP EDUCATION:  Access Code: [email or web address]  URL: [email or web address]  Date: 07/14/2025  Prepared by: Cheikh Ward     Exercises  - Supine Heel Slide  - 2 x daily - 7 x weekly - 1-2 sets - 10-15 reps - 5\" hold  - Quad Setting and Stretching  - 2 x daily - 7 x weekly - 1-2 sets - 10-15 reps - 3\" hold  - Seated Passive Knee Extension  - 2 x daily - 7 x weekly - 1 sets - 1-2 reps - 1-5 minutes hold  - Seated Heel Slide  - 2 x daily - 7 x weekly - 1-2 sets - 10-15 reps - 5\" hold  - Seated Heel Toe Raises  - 3 x " daily - 7 x weekly - 1-2 sets - 20-30 reps            Assessment:    Verbal cues needed with TE, no c/o increased pain.  Pt. Challenged with all exercises and needed breaks often.  Trial SLR's and seated marches this session.             Plan:  Continue with POC and progress as able to improve ambulation with greater ease.  MB        OP PT Plan  Treatment/Interventions: Blood flow restriction therapy, Cryotherapy, Education/ Instruction, Gait training, Electrical stimulation, Manual therapy, Therapeutic activities, Therapeutic exercises, Taping techniques  PT Plan: Skilled PT  PT Frequency: 2 times per week (1-2x/week)  Duration: 4 weeks  Onset Date: 12/15/24  Certification Period Start Date: 07/14/25  Rehab Potential: Fair  Plan of Care Agreement: Patient              Goals:  Active       PT Problem       PT Goal 1       Start:  07/14/25    Expected End:  10/12/25       Pt will be independent c HEP for progression of strength and functional mobility, in 2 weeks.           PT Goal 2       Start:  07/14/25    Expected End:  10/12/25       Pt will have <=2/10 pain for one week, for improved QOL, in 3 weeks.         PT Goal 3       Start:  07/14/25    Expected End:  10/12/25       Pt will increase AROM of R knee to 0°-115° for improved gait and mobility mechanics, in 4 weeks.         PT Goal 4       Start:  07/14/25    Expected End:  10/12/25       Pt will increase RLE strength to >=4+/5 or greater for improved gait and mobility efficiency, in 4 weeks.            PT Goal 5       Start:  07/14/25    Expected End:  10/12/25       Pt will improve LEFS to >=36/80, indicating improved functional mobility, in 4 weeks.           Patient Stated Goal 1       Start:  07/14/25    Expected End:  10/12/25       Patient states that their goal is to decrease pain and increase mobility with physical therapy intervention.

## 2025-07-25 ENCOUNTER — TREATMENT (OUTPATIENT)
Dept: PHYSICAL THERAPY | Facility: CLINIC | Age: 70
End: 2025-07-25
Payer: MEDICARE

## 2025-07-25 DIAGNOSIS — M25.561 KNEE PAIN, RIGHT: ICD-10-CM

## 2025-07-25 PROCEDURE — 97110 THERAPEUTIC EXERCISES: CPT | Mod: GP,CQ

## 2025-07-25 ASSESSMENT — PAIN - FUNCTIONAL ASSESSMENT: PAIN_FUNCTIONAL_ASSESSMENT: 0-10

## 2025-07-25 ASSESSMENT — PAIN DESCRIPTION - DESCRIPTORS: DESCRIPTORS: ACHING;DISCOMFORT

## 2025-07-25 ASSESSMENT — PAIN SCALES - GENERAL: PAINLEVEL_OUTOF10: 5 - MODERATE PAIN

## 2025-07-25 NOTE — PROGRESS NOTES
"Physical Therapy Treatment    Patient Name: Shanta Bonds  MRN: [id number]  Today's Date: 7/25/2025  Time Calculation  Start Time: 1132  Stop Time: 1210  Time Calculation (min): 38 min  PT Therapeutic Procedures Time Entry  Therapeutic Exercise Time Entry: 36         Current Problem  Problem List Items Addressed This Visit           ICD-10-CM    Knee pain, right M25.561       Subjective   Pt.'s name and birthday confirmed.  Pt. Reports compliance with HEP.  Pt. Has 4/10 pain reported with right knee.  No c/o recent falls.    Reason for Referral: R knee pain  Referred By: Breanne Pompey, PA-C  General Comment: 4      Precautions  Precautions  Precautions Comment: Defibrillator/pacemaker, DVT      Pain  Pain Assessment: 0-10  0-10 (Numeric) Pain Score: 5 - Moderate pain  Pain Type: Chronic pain  Pain Location: Knee  Pain Orientation: Right  Pain Descriptors: Aching, Discomfort    Objective:  Pt. With antalgic gait (uses FWW)      Treatments:  Supine Heel slides x10  Stepper Lv 1 x3 mins (N)   Supine Heel prop QS 2 x10  3\" hold   Supine SAQ's 2 x10  Supine SLR's 2 x10 (P)  Supine ball squeezes 2 x10  3\" hold (P seated)  Supine clamshells orange band 2 x10 (P seated)  Seated HR/TR's x20 ea  Seated marches, alt x10 (N)  Seated HS curls orange band x10   Seated foot slide 2 x 5 reps  x5\"hold   LAQ 2 x10 (P)  Slant board  2 x30\"   Step ups 4\" x5 (X)  Standing HR x10 (N)  Standing Toe taps to step x10 (N)     Manual   Grade 1-2 flexion and extension joint mobilizations x30\" each (X)            OP EDUCATION:  Access Code: [email or web address]  URL: [email or web address]  Date: 07/14/2025  Prepared by: Cheikh Ward     Exercises  - Supine Heel Slide  - 2 x daily - 7 x weekly - 1-2 sets - 10-15 reps - 5\" hold  - Quad Setting and Stretching  - 2 x daily - 7 x weekly - 1-2 sets - 10-15 reps - 3\" hold  - Seated Passive Knee Extension  - 2 x daily - 7 x weekly - 1 sets - 1-2 reps - 1-5 minutes hold  - Seated Heel " "Slide  - 2 x daily - 7 x weekly - 1-2 sets - 10-15 reps - 5\" hold  - Seated Heel Toe Raises  - 3 x daily - 7 x weekly - 1-2 sets - 20-30 reps                Assessment:   Pt. Challenged with session.  Trial stepper which she could only tolerate three mins d/t pain.  Trial step taps but patient c/o feeling lightheaded.  Increased reps noted with SLR's.              Plan:  Continue with POC and progress as able to improve gait and ROM with knee.  MB       OP PT Plan  Treatment/Interventions: Blood flow restriction therapy, Cryotherapy, Education/ Instruction, Gait training, Electrical stimulation, Manual therapy, Therapeutic activities, Therapeutic exercises, Taping techniques  PT Plan: Skilled PT  PT Frequency: 2 times per week (1-2x/week)  Duration: 4 weeks  Onset Date: 12/15/24  Certification Period Start Date: 07/14/25  Rehab Potential: Fair  Plan of Care Agreement: Patient              Goals:  Active       PT Problem       PT Goal 1       Start:  07/14/25    Expected End:  10/12/25       Pt will be independent c HEP for progression of strength and functional mobility, in 2 weeks.           PT Goal 2       Start:  07/14/25    Expected End:  10/12/25       Pt will have <=2/10 pain for one week, for improved QOL, in 3 weeks.         PT Goal 3       Start:  07/14/25    Expected End:  10/12/25       Pt will increase AROM of R knee to 0°-115° for improved gait and mobility mechanics, in 4 weeks.         PT Goal 4       Start:  07/14/25    Expected End:  10/12/25       Pt will increase RLE strength to >=4+/5 or greater for improved gait and mobility efficiency, in 4 weeks.            PT Goal 5       Start:  07/14/25    Expected End:  10/12/25       Pt will improve LEFS to >=36/80, indicating improved functional mobility, in 4 weeks.           Patient Stated Goal 1       Start:  07/14/25    Expected End:  10/12/25       Patient states that their goal is to decrease pain and increase mobility with physical therapy " intervention.

## 2025-07-30 ENCOUNTER — APPOINTMENT (OUTPATIENT)
Dept: PHYSICAL THERAPY | Facility: CLINIC | Age: 70
End: 2025-07-30
Payer: MEDICARE

## 2025-07-30 ENCOUNTER — DOCUMENTATION (OUTPATIENT)
Dept: PHYSICAL THERAPY | Facility: CLINIC | Age: 70
End: 2025-07-30
Payer: MEDICARE

## 2025-07-30 NOTE — PROGRESS NOTES
Physical Therapy                 Therapy Communication Note    Patient Name: Shanta Bonds  MRN: [id number]  Department:   Room: Room/bed info not found  Today's Date: 7/30/2025     Discipline: Physical Therapy    Missed Visit:   Pt. Called and canceled.  States she fell at home and has a lot pain now.    Missed Time: Cancel